# Patient Record
Sex: MALE | Race: WHITE | URBAN - METROPOLITAN AREA
[De-identification: names, ages, dates, MRNs, and addresses within clinical notes are randomized per-mention and may not be internally consistent; named-entity substitution may affect disease eponyms.]

---

## 2023-02-15 ENCOUNTER — OFFICE VISIT (OUTPATIENT)
Dept: URBAN - METROPOLITAN AREA CLINIC 128 | Facility: CLINIC | Age: 23
End: 2023-02-15
Payer: COMMERCIAL

## 2023-02-15 ENCOUNTER — WEB ENCOUNTER (OUTPATIENT)
Dept: URBAN - METROPOLITAN AREA CLINIC 128 | Facility: CLINIC | Age: 23
End: 2023-02-15

## 2023-02-15 VITALS
SYSTOLIC BLOOD PRESSURE: 122 MMHG | TEMPERATURE: 98.4 F | WEIGHT: 154 LBS | HEIGHT: 71 IN | DIASTOLIC BLOOD PRESSURE: 70 MMHG | BODY MASS INDEX: 21.56 KG/M2

## 2023-02-15 DIAGNOSIS — R10.30 LOWER ABDOMINAL PAIN: ICD-10-CM

## 2023-02-15 DIAGNOSIS — K58.0 IRRITABLE BOWEL SYNDROME WITH DIARRHEA: ICD-10-CM

## 2023-02-15 DIAGNOSIS — R14.0 BLOATING SYMPTOM: ICD-10-CM

## 2023-02-15 PROCEDURE — 99203 OFFICE O/P NEW LOW 30 MIN: CPT | Performed by: INTERNAL MEDICINE

## 2023-02-15 RX ORDER — HYOSCYAMINE SULFATE 0.38 MG/1
HALF TABLET TABLET, EXTENDED RELEASE ORAL
Qty: 30 | Refills: 6 | OUTPATIENT
Start: 2023-02-15 | End: 2023-09-13

## 2023-02-15 NOTE — HPI-TODAY'S VISIT:
Pleasant 23yo college student who comes in for evaluation of a 6 month history of GI distress characterized as abdominal fullness and cramping in the lower abdomen transiently relieved by bowel evacuation.  NO weight loss.  He sometimes as a sense of incomplete evacuation.  Similar GI distress during high school during times of stress.  Issues resurfaced when under increased stress when he started back to college last fall.  Symptoms better now with online instead of in-person classes.  Infrequent BRB on tissue with wiping.  Sister with celiac.  Symptoms occur even on gluten free diet.  Avoids milk productions due to intolerance/allergy.  Recent CMP normal except bili 1.8. Lipase normal.  No prior luminal GI evaluation.

## 2023-02-19 ENCOUNTER — TELEPHONE ENCOUNTER (OUTPATIENT)
Dept: URBAN - METROPOLITAN AREA CLINIC 92 | Facility: CLINIC | Age: 23
End: 2023-02-19

## 2023-02-19 PROBLEM — 197125005: Status: ACTIVE | Noted: 2023-02-15

## 2023-02-19 RX ORDER — HYOSCYAMINE SULFATE 0.38 MG/1
HALF TABLET TABLET, EXTENDED RELEASE ORAL
Qty: 30 | Refills: 6 | Status: ACTIVE | COMMUNITY
Start: 2023-02-15 | End: 2023-09-13

## 2023-02-19 RX ORDER — HYOSCYAMINE SULFATE 0.38 MG/1
HALF TABLET TABLET, EXTENDED RELEASE ORAL
Qty: 90 TABLET | Refills: 3 | OUTPATIENT

## 2023-03-16 ENCOUNTER — OFFICE VISIT (OUTPATIENT)
Dept: URBAN - METROPOLITAN AREA CLINIC 128 | Facility: CLINIC | Age: 23
End: 2023-03-16

## 2023-03-27 ENCOUNTER — TELEPHONE ENCOUNTER (OUTPATIENT)
Dept: URBAN - METROPOLITAN AREA CLINIC 128 | Facility: CLINIC | Age: 23
End: 2023-03-27

## 2023-03-28 ENCOUNTER — OFFICE VISIT (OUTPATIENT)
Dept: URBAN - METROPOLITAN AREA CLINIC 128 | Facility: CLINIC | Age: 23
End: 2023-03-28

## 2023-04-05 LAB
IMMUNOGLOBULIN A: 443
INTERPRETATION: (no result)
TISSUE TRANSGLUTAMINASE AB, IGA: 8.6

## 2023-04-11 ENCOUNTER — OFFICE VISIT (OUTPATIENT)
Dept: URBAN - METROPOLITAN AREA CLINIC 128 | Facility: CLINIC | Age: 23
End: 2023-04-11
Payer: COMMERCIAL

## 2023-04-11 ENCOUNTER — DASHBOARD ENCOUNTERS (OUTPATIENT)
Age: 23
End: 2023-04-11

## 2023-04-11 VITALS
HEIGHT: 71 IN | WEIGHT: 157 LBS | TEMPERATURE: 97.7 F | SYSTOLIC BLOOD PRESSURE: 126 MMHG | DIASTOLIC BLOOD PRESSURE: 68 MMHG | BODY MASS INDEX: 21.98 KG/M2

## 2023-04-11 DIAGNOSIS — R10.30 LOWER ABDOMINAL PAIN: ICD-10-CM

## 2023-04-11 DIAGNOSIS — R14.0 BLOATING SYMPTOM: ICD-10-CM

## 2023-04-11 DIAGNOSIS — K58.0 IRRITABLE BOWEL SYNDROME WITH DIARRHEA: ICD-10-CM

## 2023-04-11 PROCEDURE — 99213 OFFICE O/P EST LOW 20 MIN: CPT | Performed by: INTERNAL MEDICINE

## 2023-04-11 RX ORDER — HYOSCYAMINE SULFATE 0.38 MG/1
HALF TABLET TABLET, EXTENDED RELEASE ORAL
Qty: 90 TABLET | Refills: 3 | Status: ACTIVE | COMMUNITY

## 2023-04-11 RX ORDER — HYOSCYAMINE SULFATE 0.38 MG/1
HALF TABLET TABLET, EXTENDED RELEASE ORAL
Qty: 30 | Refills: 6 | Status: ACTIVE | COMMUNITY
Start: 2023-02-15 | End: 2023-09-13

## 2023-04-11 NOTE — HPI-TODAY'S VISIT:
Mr. Anderson comes in for a follow up appointment offering that he is overall much better and notes that improvement occurred when his stress level decreased. BMs are daily and sometimes still hard and assoc with some straining and anorectal discomfort.  Bloating has improved significantly.  Appetite is good and weight stable.  No abdominal pain.  No fever or chills, rash or pruritis, joint aches, no overt bleeding.  Celiac labwork was normal.   There is hx of multiple foof allergies.  No UGI symptoms.

## 2023-04-11 NOTE — PHYSICAL EXAM LYMPHATIC:
no cervical lymphadenopathy, no supraclavicular lymphadenopathy [General Appearance - Alert] : alert [General Appearance - In No Acute Distress] : in no acute distress [General Appearance - Well Nourished] : well nourished [Sclera] : the sclera and conjunctiva were normal [Outer Ear] : the ears and nose were normal in appearance [Jugular Venous Distention Increased] : there was no jugular-venous distention [Respiration, Rhythm And Depth] : normal respiratory rhythm and effort [Auscultation Breath Sounds / Voice Sounds] : lungs were clear to auscultation bilaterally [Heart Sounds] : normal S1 and S2 [Heart Sounds Gallop] : no gallops [Heart Sounds Pericardial Friction Rub] : no pericardial rub [Edema] : there was no peripheral edema [Abdomen Soft] : soft [Abdomen Tenderness] : non-tender [No CVA Tenderness] : no ~M costovertebral angle tenderness [Abnormal Walk] : normal gait [Nail Clubbing] : no clubbing  or cyanosis of the fingernails [] : no rash [Oriented To Time, Place, And Person] : oriented to person, place, and time [Affect] : the affect was normal [Mood] : the mood was normal

## 2023-06-29 ENCOUNTER — WEB ENCOUNTER (OUTPATIENT)
Dept: URBAN - METROPOLITAN AREA CLINIC 128 | Facility: CLINIC | Age: 23
End: 2023-06-29

## 2023-07-12 ENCOUNTER — WEB ENCOUNTER (OUTPATIENT)
Dept: URBAN - METROPOLITAN AREA CLINIC 128 | Facility: CLINIC | Age: 23
End: 2023-07-12

## 2023-07-12 RX ORDER — HYOSCYAMINE SULFATE 0.38 MG/1
HALF TABLET TABLET, EXTENDED RELEASE ORAL
Qty: 30 | Refills: 6 | Status: ACTIVE | COMMUNITY
Start: 2023-02-15 | End: 2023-09-13

## 2023-07-12 RX ORDER — DICYCLOMINE HYDROCHLORIDE 10 MG/1
TAKE 1 TO 2 CAPSULES CAPSULE ORAL
Qty: 180 CAPSULE | Refills: 3 | OUTPATIENT
Start: 2023-08-03 | End: 2024-03-30

## 2023-07-12 RX ORDER — HYOSCYAMINE SULFATE 0.38 MG/1
HALF TABLET TABLET, EXTENDED RELEASE ORAL
Qty: 90 TABLET | Refills: 3 | Status: ACTIVE | COMMUNITY

## 2023-09-19 ENCOUNTER — ERX REFILL RESPONSE (OUTPATIENT)
Dept: URBAN - METROPOLITAN AREA CLINIC 128 | Facility: CLINIC | Age: 23
End: 2023-09-19

## 2023-09-19 RX ORDER — DICYCLOMINE HYDROCHLORIDE 10 MG/1
TAKE 1 TO 2 CAPSULES CAPSULE ORAL
Qty: 180 CAPSULE | Refills: 3 | OUTPATIENT

## 2024-01-19 ENCOUNTER — TELEPHONE ENCOUNTER (OUTPATIENT)
Dept: URBAN - METROPOLITAN AREA CLINIC 128 | Facility: CLINIC | Age: 24
End: 2024-01-19

## 2024-01-19 RX ORDER — HYOSCYAMINE SULFATE 0.38 MG/1
HALF TABLET TABLET, EXTENDED RELEASE ORAL TWICE A DAY
Qty: 90 TABLET | Refills: 3
End: 2025-01-13

## 2025-02-14 ENCOUNTER — TELEPHONE ENCOUNTER (OUTPATIENT)
Dept: URBAN - METROPOLITAN AREA CLINIC 128 | Facility: CLINIC | Age: 25
End: 2025-02-14

## 2025-02-14 RX ORDER — HYOSCYAMINE SULFATE 0.38 MG/1
HALF A TABLET TABLET, EXTENDED RELEASE ORAL
Qty: 90 TABLET | Refills: 3 | OUTPATIENT
Start: 2025-02-14 | End: 2026-02-09

## 2025-03-17 ENCOUNTER — ERX REFILL RESPONSE (OUTPATIENT)
Dept: URBAN - METROPOLITAN AREA CLINIC 128 | Facility: CLINIC | Age: 25
End: 2025-03-17

## 2025-03-17 RX ORDER — HYOSCYAMINE SULFATE 0.38 MG/1
HALF A TABLET TABLET, EXTENDED RELEASE ORAL
Qty: 90 TABLET | Refills: 3 | OUTPATIENT